# Patient Record
Sex: FEMALE | Race: WHITE | URBAN - METROPOLITAN AREA
[De-identification: names, ages, dates, MRNs, and addresses within clinical notes are randomized per-mention and may not be internally consistent; named-entity substitution may affect disease eponyms.]

---

## 2018-11-01 LAB — ANTIBODY: NORMAL

## 2020-09-11 LAB — PAP SMEAR, EXTERNAL: NORMAL

## 2023-05-19 ENCOUNTER — OFFICE VISIT (OUTPATIENT)
Dept: PRIMARY CARE CLINIC | Age: 23
End: 2023-05-19
Payer: COMMERCIAL

## 2023-05-19 VITALS
TEMPERATURE: 98.3 F | HEIGHT: 64 IN | BODY MASS INDEX: 29.43 KG/M2 | OXYGEN SATURATION: 99 % | DIASTOLIC BLOOD PRESSURE: 61 MMHG | WEIGHT: 172.4 LBS | HEART RATE: 71 BPM | SYSTOLIC BLOOD PRESSURE: 98 MMHG

## 2023-05-19 DIAGNOSIS — M25.50 ARTHRALGIA, UNSPECIFIED JOINT: ICD-10-CM

## 2023-05-19 DIAGNOSIS — R21 RASH: Primary | ICD-10-CM

## 2023-05-19 DIAGNOSIS — R21 RASH: ICD-10-CM

## 2023-05-19 DIAGNOSIS — R53.83 OTHER FATIGUE: ICD-10-CM

## 2023-05-19 LAB
BASOPHILS # BLD: 0 K/UL (ref 0–0.2)
BASOPHILS NFR BLD: 0.4 %
CRP SERPL-MCNC: <3 MG/L (ref 0–5.1)
DEPRECATED RDW RBC AUTO: 12.6 % (ref 12.4–15.4)
EOSINOPHIL # BLD: 0.1 K/UL (ref 0–0.6)
EOSINOPHIL NFR BLD: 2.5 %
ERYTHROCYTE [SEDIMENTATION RATE] IN BLOOD BY WESTERGREN METHOD: 8 MM/HR (ref 0–20)
HCT VFR BLD AUTO: 35.7 % (ref 36–48)
HGB BLD-MCNC: 12.3 G/DL (ref 12–16)
LYMPHOCYTES # BLD: 1.9 K/UL (ref 1–5.1)
LYMPHOCYTES NFR BLD: 39.1 %
MCH RBC QN AUTO: 32.1 PG (ref 26–34)
MCHC RBC AUTO-ENTMCNC: 34.6 G/DL (ref 31–36)
MCV RBC AUTO: 92.6 FL (ref 80–100)
MONOCYTES # BLD: 0.4 K/UL (ref 0–1.3)
MONOCYTES NFR BLD: 7.7 %
NEUTROPHILS # BLD: 2.4 K/UL (ref 1.7–7.7)
NEUTROPHILS NFR BLD: 50.3 %
PLATELET # BLD AUTO: 284 K/UL (ref 135–450)
PMV BLD AUTO: 8 FL (ref 5–10.5)
RBC # BLD AUTO: 3.85 M/UL (ref 4–5.2)
RHEUMATOID FACT SER IA-ACNC: <10 IU/ML
T4 FREE SERPL-MCNC: 1.3 NG/DL (ref 0.9–1.8)
TSH SERPL DL<=0.005 MIU/L-ACNC: 2.44 UIU/ML (ref 0.27–4.2)
TSH SERPL DL<=0.005 MIU/L-ACNC: 2.44 UIU/ML (ref 0.27–4.2)
WBC # BLD AUTO: 4.8 K/UL (ref 4–11)

## 2023-05-19 PROCEDURE — 99204 OFFICE O/P NEW MOD 45 MIN: CPT | Performed by: NURSE PRACTITIONER

## 2023-05-19 RX ORDER — ETONOGESTREL 68 MG/1
68 IMPLANT SUBCUTANEOUS ONCE
COMMUNITY

## 2023-05-19 SDOH — ECONOMIC STABILITY: FOOD INSECURITY: WITHIN THE PAST 12 MONTHS, THE FOOD YOU BOUGHT JUST DIDN'T LAST AND YOU DIDN'T HAVE MONEY TO GET MORE.: NEVER TRUE

## 2023-05-19 SDOH — ECONOMIC STABILITY: FOOD INSECURITY: WITHIN THE PAST 12 MONTHS, YOU WORRIED THAT YOUR FOOD WOULD RUN OUT BEFORE YOU GOT MONEY TO BUY MORE.: NEVER TRUE

## 2023-05-19 SDOH — ECONOMIC STABILITY: INCOME INSECURITY: HOW HARD IS IT FOR YOU TO PAY FOR THE VERY BASICS LIKE FOOD, HOUSING, MEDICAL CARE, AND HEATING?: NOT HARD AT ALL

## 2023-05-19 SDOH — ECONOMIC STABILITY: HOUSING INSECURITY
IN THE LAST 12 MONTHS, WAS THERE A TIME WHEN YOU DID NOT HAVE A STEADY PLACE TO SLEEP OR SLEPT IN A SHELTER (INCLUDING NOW)?: NO

## 2023-05-19 ASSESSMENT — PATIENT HEALTH QUESTIONNAIRE - PHQ9
SUM OF ALL RESPONSES TO PHQ QUESTIONS 1-9: 0
2. FEELING DOWN, DEPRESSED OR HOPELESS: 0
SUM OF ALL RESPONSES TO PHQ QUESTIONS 1-9: 0
SUM OF ALL RESPONSES TO PHQ QUESTIONS 1-9: 0
SUM OF ALL RESPONSES TO PHQ9 QUESTIONS 1 & 2: 0
SUM OF ALL RESPONSES TO PHQ QUESTIONS 1-9: 0
1. LITTLE INTEREST OR PLEASURE IN DOING THINGS: 0

## 2023-05-19 ASSESSMENT — ENCOUNTER SYMPTOMS
SHORTNESS OF BREATH: 0
SORE THROAT: 0
ABDOMINAL PAIN: 0

## 2023-05-19 NOTE — PATIENT INSTRUCTIONS
[facility] Locations - No appointment necessary. @ indicates the location is open Saturdays in addition to Monday through Friday. Call your preferred location for test preparation, business hours and other information you need. [employer]. [facility]     @ [address] 5980 [facility], [address]    Ph: 28 [facility] [place], [address]    Ph: [phone]   @ [address] [place] [address].[facility]    Ph: Mal[address]    Ph: [phone]  @ [address]   Ph: [phone]  @ [address] [place] [address]. Elliott Soto [facility] 429    Ph: [address] [place], [place] [address]   Ph: [phone]    NORTH    @ [facility]. Noeltt [address]., [address] [facility] [address] [address]    Ph: [phone]  [address], [address]   Ph: [address] Mary Sinks. [place], [address] [place] [address] 30: 311 [facility] Abdulkadir Bill    Ph: [phone]   [facility]   [address] 2026 [facility]. Abdulkadir Rothman   Ph: 501 [facility]  [address] [facility] [address]    Ph: [phone]

## 2023-05-19 NOTE — PROGRESS NOTES
60 [facility] PRIMARY [facility]  [address] [place] [address]  Dept: [phone]  Dept Fax: [phone]     5/19/2023      Davi Bowen   2000     Chief Complaint   Patient presents with    Rash     Rash and swelling on feet       HPI     Patient presents with rash that she's had for about 2 months. Saw an allergist at [facility] and states they didn't do any testing and told her she has chronic hives. They told her to take Zyrtec max dose of 20 mg daily. She states she has not been doing this regularly. Rash comes and goes. Rash is mostly to bilateral legs and arms. States it is currently not present. She also has complaint of swelling to feet. States feet feel heavy when she walks at times. She states this has been going on for approx the same amount of time as the rash. Also has intermittent low back pain and hip pain. Reports she's also gained approx 50 pounds in the last 2 years. States she usually weighs around 125 pounds. Current weight is 172lb. She has no major PMH. She has a nexplanon she got in 2020 but does not take any other medications. She reports her mother has RA.     PHQ Scores 5/19/2023   PHQ2 Score 0   PHQ9 Score 0     Interpretation of Total Score Depression Severity: 1-4 = Minimal depression, 5-9 = Mild depression, 10-14 = Moderate depression, 15-19 = Moderately severe depression, 20-27 = Severe depression     Prior to Visit Medications    Not on File       No past medical history on file. Social History     Tobacco Use    Smoking status: Never    Smokeless tobacco: Never   Substance Use Topics    Alcohol use: Yes     Comment: very rarely    Drug use: Never        No past surgical history on file. No Known Allergies     No family history on file. Patient's past medical history, surgical history, family history, medications, and allergies  were all reviewed and updated as appropriate today.     Review of Systems   Constitutional:

## 2023-05-20 LAB
ANA SER QL IA: NEGATIVE
CCP IGG SERPL-ACNC: <0.5 U/ML (ref 0–2.9)

## 2023-05-22 DIAGNOSIS — R21 RASH: Primary | ICD-10-CM

## 2023-07-28 ENCOUNTER — OFFICE VISIT (OUTPATIENT)
Dept: PRIMARY CARE CLINIC | Age: 23
End: 2023-07-28
Payer: COMMERCIAL

## 2023-07-28 VITALS
DIASTOLIC BLOOD PRESSURE: 63 MMHG | HEART RATE: 61 BPM | BODY MASS INDEX: 29.4 KG/M2 | SYSTOLIC BLOOD PRESSURE: 101 MMHG | OXYGEN SATURATION: 98 % | WEIGHT: 168.6 LBS | TEMPERATURE: 98 F

## 2023-07-28 DIAGNOSIS — F33.41 RECURRENT MAJOR DEPRESSIVE DISORDER, IN PARTIAL REMISSION (HCC): ICD-10-CM

## 2023-07-28 DIAGNOSIS — F41.1 GAD (GENERALIZED ANXIETY DISORDER): ICD-10-CM

## 2023-07-28 DIAGNOSIS — F90.0 ATTENTION DEFICIT HYPERACTIVITY DISORDER (ADHD), PREDOMINANTLY INATTENTIVE TYPE: Primary | ICD-10-CM

## 2023-07-28 PROCEDURE — 99214 OFFICE O/P EST MOD 30 MIN: CPT | Performed by: NURSE PRACTITIONER

## 2023-07-28 RX ORDER — ATOMOXETINE 40 MG/1
CAPSULE ORAL
Qty: 60 CAPSULE | Refills: 1 | Status: SHIPPED | OUTPATIENT
Start: 2023-07-28

## 2023-07-28 RX ORDER — FLUOXETINE HYDROCHLORIDE 40 MG/1
40 CAPSULE ORAL DAILY
COMMUNITY
End: 2023-07-28

## 2023-07-28 RX ORDER — LORATADINE 10 MG/1
10 TABLET ORAL DAILY
COMMUNITY

## 2023-07-28 ASSESSMENT — ENCOUNTER SYMPTOMS
SHORTNESS OF BREATH: 0
ABDOMINAL PAIN: 0
COUGH: 0
SORE THROAT: 0

## 2023-07-28 NOTE — PROGRESS NOTES
5555 [facility]. PRIMARY CARE  [address] [facility] [address]  Dept: [phone]  Dept Fax: [phone]     7/28/2023      Catherine Guajardo   2000     Chief Complaint   Patient presents with    ADHD       HPI     Patient presents to discuss ADHD. She was diagnosed with LAMONT, MDD, and ADHD-predomininatly inattentive type by [facility]. Her psychiatrist wanted to try to treat the anxiety and depression first and see if it helped the ADHD symptoms. She reports she has tried Zoloft, Wellbutrin, Prozac. Wellbutrin and Zoloft caused nausea and brain zaps. She felt like Prozac made her feel numb. She is interested in trying a non-stimulant medication for ADHD at this time. PHQ Scores 5/19/2023   PHQ2 Score 0   PHQ9 Score 0     Interpretation of Total Score Depression Severity: 1-4 = Minimal depression, 5-9 = Mild depression, 10-14 = Moderate depression, 15-19 = Moderately severe depression, 20-27 = Severe depression     Prior to Visit Medications    Medication Sig Taking? Authorizing Provider   loratadine (CLARITIN) 10 MG tablet Take 1 tablet by mouth daily Yes Historical Provider, MD   FLUoxetine (PROZAC) 40 MG capsule Take 1 capsule by mouth daily Yes Historical Provider, MD   etonogestrel (NEXPLANON) 68 MG implant 68 mg by NOT APPLICABLE route once Yes Historical Provider, MD       No past medical history on file. Social History     Tobacco Use    Smoking status: Never    Smokeless tobacco: Never   Substance Use Topics    Alcohol use: Yes     Comment: very rarely    Drug use: Never        No past surgical history on file. No Known Allergies     No family history on file. Patient's past medical history, surgical history, family history, medications, and allergies  were all reviewed and updated as appropriate today. Review of Systems   Constitutional:  Negative for fatigue and fever. HENT:  Negative for congestion and sore throat.     Respiratory:

## 2023-08-07 ENCOUNTER — PATIENT MESSAGE (OUTPATIENT)
Dept: PRIMARY CARE CLINIC | Age: 23
End: 2023-08-07

## 2023-08-22 DIAGNOSIS — F90.0 ATTENTION DEFICIT HYPERACTIVITY DISORDER (ADHD), PREDOMINANTLY INATTENTIVE TYPE: ICD-10-CM

## 2023-08-22 RX ORDER — ATOMOXETINE 40 MG/1
80 CAPSULE ORAL DAILY
Qty: 180 CAPSULE | Refills: 1 | OUTPATIENT
Start: 2023-08-22

## 2023-08-22 NOTE — TELEPHONE ENCOUNTER
Medication:   Requested Prescriptions     Pending Prescriptions Disp Refills    atomoxetine (STRATTERA) 40 MG capsule 180 capsule 1     Sig: Take 2 capsules by mouth daily Take 40mg x 1 week; then increase to 80 mg daily     Last Filled:  n/a    Last appt: 7/28/2023   Next appt: Visit date not found

## 2023-08-30 ENCOUNTER — OFFICE VISIT (OUTPATIENT)
Dept: PRIMARY CARE CLINIC | Age: 23
End: 2023-08-30
Payer: COMMERCIAL

## 2023-08-30 VITALS
DIASTOLIC BLOOD PRESSURE: 62 MMHG | WEIGHT: 163.2 LBS | TEMPERATURE: 98.4 F | BODY MASS INDEX: 28.46 KG/M2 | SYSTOLIC BLOOD PRESSURE: 91 MMHG | HEART RATE: 79 BPM

## 2023-08-30 DIAGNOSIS — F90.0 ATTENTION DEFICIT HYPERACTIVITY DISORDER (ADHD), PREDOMINANTLY INATTENTIVE TYPE: ICD-10-CM

## 2023-08-30 DIAGNOSIS — F41.1 GAD (GENERALIZED ANXIETY DISORDER): Primary | ICD-10-CM

## 2023-08-30 PROCEDURE — 99214 OFFICE O/P EST MOD 30 MIN: CPT | Performed by: NURSE PRACTITIONER

## 2023-08-30 ASSESSMENT — ENCOUNTER SYMPTOMS
SORE THROAT: 0
ABDOMINAL PAIN: 0
SHORTNESS OF BREATH: 0

## 2023-09-22 ENCOUNTER — OFFICE VISIT (OUTPATIENT)
Dept: PRIMARY CARE CLINIC | Age: 23
End: 2023-09-22
Payer: COMMERCIAL

## 2023-09-22 VITALS
SYSTOLIC BLOOD PRESSURE: 96 MMHG | BODY MASS INDEX: 28.7 KG/M2 | HEART RATE: 65 BPM | WEIGHT: 164.6 LBS | DIASTOLIC BLOOD PRESSURE: 63 MMHG | OXYGEN SATURATION: 98 %

## 2023-09-22 DIAGNOSIS — F41.1 GAD (GENERALIZED ANXIETY DISORDER): Primary | ICD-10-CM

## 2023-09-22 DIAGNOSIS — F90.0 ATTENTION DEFICIT HYPERACTIVITY DISORDER (ADHD), PREDOMINANTLY INATTENTIVE TYPE: ICD-10-CM

## 2023-09-22 PROCEDURE — 99214 OFFICE O/P EST MOD 30 MIN: CPT | Performed by: NURSE PRACTITIONER

## 2023-09-22 RX ORDER — BUSPIRONE HYDROCHLORIDE 5 MG/1
5 TABLET ORAL 2 TIMES DAILY
Qty: 60 TABLET | Refills: 1 | Status: SHIPPED | OUTPATIENT
Start: 2023-09-22 | End: 2023-11-21

## 2023-09-22 ASSESSMENT — ENCOUNTER SYMPTOMS
ABDOMINAL PAIN: 0
SORE THROAT: 0
SHORTNESS OF BREATH: 0

## 2023-09-22 NOTE — PROGRESS NOTES
5555 [facility]. PRIMARY CARE  [address] [facility] [address]  Dept: [phone]  Dept Fax: [phone]     9/22/2023      [facility]   2000     Chief Complaint   Patient presents with    Results       HPI     Patient presents to review [employer] testing. She has history of anxiety, ADHD, MDD. Her main complaint at this time is anxiety and difficulty focusing. She has previously tried Zoloft, Wellbutrin, Prozac, Strattera. Today we discuss medication options that may work best for her based off GeneSight results. 5/19/2023     7:40 AM   PHQ Scores   PHQ2 Score 0   PHQ9 Score 0     Interpretation of Total Score Depression Severity: 1-4 = Minimal depression, 5-9 = Mild depression, 10-14 = Moderate depression, 15-19 = Moderately severe depression, 20-27 = Severe depression     Prior to Visit Medications    Medication Sig Taking? Authorizing Provider   loratadine (CLARITIN) 10 MG tablet Take 1 tablet by mouth daily  Historical Provider, MD   etonogestrel (NEXPLANON) 68 MG implant 68 mg by NOT APPLICABLE route once  Historical Provider, MD       No past medical history on file. Social History     Tobacco Use    Smoking status: Never    Smokeless tobacco: Never   Substance Use Topics    Alcohol use: Yes     Comment: very rarely    Drug use: Never        No past surgical history on file. No Known Allergies     No family history on file. Patient's past medical history, surgical history, family history, medications, and allergies  were all reviewed and updated as appropriate today. Review of Systems   Constitutional:  Negative for fatigue and fever. HENT:  Negative for congestion and sore throat. Respiratory:  Negative for shortness of breath. Cardiovascular:  Positive for palpitations. Negative for chest pain. Gastrointestinal:  Negative for abdominal pain. Genitourinary:  Negative for difficulty urinating.    Neurological:  Negative

## 2023-10-08 ENCOUNTER — OFFICE VISIT (OUTPATIENT)
Dept: URGENT CARE | Age: 23
End: 2023-10-08

## 2023-10-08 VITALS
TEMPERATURE: 98.7 F | SYSTOLIC BLOOD PRESSURE: 107 MMHG | HEIGHT: 63 IN | WEIGHT: 161 LBS | OXYGEN SATURATION: 98 % | HEART RATE: 73 BPM | DIASTOLIC BLOOD PRESSURE: 73 MMHG | BODY MASS INDEX: 28.53 KG/M2

## 2023-10-08 DIAGNOSIS — U07.1 COVID: Primary | ICD-10-CM

## 2023-10-08 DIAGNOSIS — R05.1 ACUTE COUGH: ICD-10-CM

## 2023-10-08 LAB
Lab: ABNORMAL
QC PASS/FAIL: ABNORMAL
SARS-COV-2 RDRP RESP QL NAA+PROBE: POSITIVE